# Patient Record
Sex: MALE | Race: WHITE
[De-identification: names, ages, dates, MRNs, and addresses within clinical notes are randomized per-mention and may not be internally consistent; named-entity substitution may affect disease eponyms.]

---

## 2017-02-10 ENCOUNTER — HOSPITAL ENCOUNTER (EMERGENCY)
Dept: HOSPITAL 57 - BURERS | Age: 82
Discharge: HOME | End: 2017-02-10
Payer: MEDICARE

## 2017-02-10 DIAGNOSIS — B35.6: Primary | ICD-10-CM

## 2017-02-10 DIAGNOSIS — Z79.899: ICD-10-CM

## 2017-02-10 DIAGNOSIS — E78.5: ICD-10-CM

## 2017-02-10 DIAGNOSIS — I10: ICD-10-CM

## 2017-02-10 PROCEDURE — 99283 EMERGENCY DEPT VISIT LOW MDM: CPT

## 2017-02-10 PROCEDURE — 36416 COLLJ CAPILLARY BLOOD SPEC: CPT

## 2017-10-23 ENCOUNTER — HOSPITAL ENCOUNTER (INPATIENT)
Dept: HOSPITAL 92 - T4-B | Age: 82
LOS: 4 days | Discharge: HOME | DRG: 603 | End: 2017-10-27
Attending: FAMILY MEDICINE | Admitting: FAMILY MEDICINE
Payer: MEDICARE

## 2017-10-23 VITALS — BODY MASS INDEX: 25.2 KG/M2

## 2017-10-23 DIAGNOSIS — I25.10: ICD-10-CM

## 2017-10-23 DIAGNOSIS — E78.5: ICD-10-CM

## 2017-10-23 DIAGNOSIS — Z95.1: ICD-10-CM

## 2017-10-23 DIAGNOSIS — Z95.2: ICD-10-CM

## 2017-10-23 DIAGNOSIS — Z83.3: ICD-10-CM

## 2017-10-23 DIAGNOSIS — Z88.5: ICD-10-CM

## 2017-10-23 DIAGNOSIS — Z82.49: ICD-10-CM

## 2017-10-23 DIAGNOSIS — I10: ICD-10-CM

## 2017-10-23 DIAGNOSIS — E87.1: ICD-10-CM

## 2017-10-23 DIAGNOSIS — I48.91: ICD-10-CM

## 2017-10-23 DIAGNOSIS — L03.116: Primary | ICD-10-CM

## 2017-10-23 LAB
ALP SERPL-CCNC: 52 U/L (ref 40–150)
ALT SERPL W P-5'-P-CCNC: 13 U/L (ref 8–55)
ANION GAP SERPL CALC-SCNC: 16 MMOL/L (ref 10–20)
AST SERPL-CCNC: 24 U/L (ref 5–34)
BASOPHILS # BLD AUTO: 0.1 THOU/UL (ref 0–0.2)
BASOPHILS NFR BLD AUTO: 0.5 % (ref 0–1)
BILIRUB SERPL-MCNC: 1.7 MG/DL (ref 0.2–1.2)
BUN SERPL-MCNC: 9 MG/DL (ref 8.4–25.7)
CALCIUM SERPL-MCNC: 9.3 MG/DL (ref 7.8–10.44)
CHLORIDE SERPL-SCNC: 89 MMOL/L (ref 98–107)
CO2 SERPL-SCNC: 27 MMOL/L (ref 23–31)
CREAT CL PREDICTED SERPL C-G-VRATE: 0 ML/MIN (ref 70–130)
EOSINOPHIL # BLD AUTO: 0.1 THOU/UL (ref 0–0.7)
EOSINOPHIL NFR BLD AUTO: 0.7 % (ref 0–10)
GLOBULIN SER CALC-MCNC: 3.9 G/DL (ref 2.4–3.5)
HCT VFR BLD CALC: 40.2 % (ref 42–52)
HYALINE CASTS #/AREA URNS LPF: (no result) LPF
LYMPHOCYTES # BLD: 1.4 THOU/UL (ref 1.2–3.4)
LYMPHOCYTES NFR BLD AUTO: 12.8 % (ref 21–51)
MONOCYTES # BLD AUTO: 0.9 THOU/UL (ref 0.11–0.59)
MONOCYTES NFR BLD AUTO: 8.4 % (ref 0–10)
NEUTROPHILS # BLD AUTO: 8.2 THOU/UL (ref 1.4–6.5)
RBC # BLD AUTO: 4.14 MILL/UL (ref 4.7–6.1)
RBC UR QL AUTO: (no result) HPF (ref 0–3)
WBC # BLD AUTO: 10.5 THOU/UL (ref 4.8–10.8)
WBC UR QL AUTO: (no result) HPF (ref 0–3)

## 2017-10-23 PROCEDURE — 81001 URINALYSIS AUTO W/SCOPE: CPT

## 2017-10-23 PROCEDURE — A4216 STERILE WATER/SALINE, 10 ML: HCPCS

## 2017-10-23 PROCEDURE — 93010 ELECTROCARDIOGRAM REPORT: CPT

## 2017-10-23 PROCEDURE — 84443 ASSAY THYROID STIM HORMONE: CPT

## 2017-10-23 PROCEDURE — 71020: CPT

## 2017-10-23 PROCEDURE — 80061 LIPID PANEL: CPT

## 2017-10-23 PROCEDURE — 85025 COMPLETE CBC W/AUTO DIFF WBC: CPT

## 2017-10-23 PROCEDURE — 93005 ELECTROCARDIOGRAM TRACING: CPT

## 2017-10-23 PROCEDURE — 80048 BASIC METABOLIC PNL TOTAL CA: CPT

## 2017-10-23 PROCEDURE — 87040 BLOOD CULTURE FOR BACTERIA: CPT

## 2017-10-23 PROCEDURE — 80053 COMPREHEN METABOLIC PANEL: CPT

## 2017-10-23 PROCEDURE — 36415 COLL VENOUS BLD VENIPUNCTURE: CPT

## 2017-10-23 PROCEDURE — 83930 ASSAY OF BLOOD OSMOLALITY: CPT

## 2017-10-23 RX ADMIN — Medication SCH ML: at 21:29

## 2017-10-23 RX ADMIN — LINEZOLID SCH MLS: 600 INJECTION, SOLUTION INTRAVENOUS at 21:27

## 2017-10-23 NOTE — EKG
Test Reason : 

Blood Pressure : ***/*** mmHG

Vent. Rate : 079 BPM     Atrial Rate : 079 BPM

   P-R Int : 226 ms          QRS Dur : 156 ms

    QT Int : 440 ms       P-R-T Axes : 062 -53 071 degrees

   QTc Int : 504 ms

 

Sinus rhythm with sinus arrhythmia with 1st degree A-V block

Right bundle branch block

Left anterior fascicular block

*** Bifascicular block ***

Abnormal ECG

When compared with ECG of 07-AUG-2015 07:04,

TX interval has increased

Nonspecific T wave abnormality no longer evident in Inferior leads

T wave inversion now evident in Lateral leads

Confirmed by DR. KRISTIE VILLA (3) on 10/23/2017 4:57:15 PM

 

Referred By:  RAISSA           Confirmed By:DR. KRISTIE VILLA

## 2017-10-24 LAB
ALP SERPL-CCNC: 45 U/L (ref 40–150)
ALT SERPL W P-5'-P-CCNC: 13 U/L (ref 8–55)
ANION GAP SERPL CALC-SCNC: 12 MMOL/L (ref 10–20)
AST SERPL-CCNC: 23 U/L (ref 5–34)
BASOPHILS # BLD AUTO: 0.1 THOU/UL (ref 0–0.2)
BASOPHILS NFR BLD AUTO: 1.4 % (ref 0–1)
BILIRUB SERPL-MCNC: 1.3 MG/DL (ref 0.2–1.2)
BUN SERPL-MCNC: 8 MG/DL (ref 8.4–25.7)
CALCIUM SERPL-MCNC: 8.3 MG/DL (ref 7.8–10.44)
CHLORIDE SERPL-SCNC: 93 MMOL/L (ref 98–107)
CO2 SERPL-SCNC: 24 MMOL/L (ref 23–31)
CREAT CL PREDICTED SERPL C-G-VRATE: 102 ML/MIN (ref 70–130)
EOSINOPHIL # BLD AUTO: 0.4 THOU/UL (ref 0–0.7)
EOSINOPHIL NFR BLD AUTO: 4.9 % (ref 0–10)
GLOBULIN SER CALC-MCNC: 2.8 G/DL (ref 2.4–3.5)
HCT VFR BLD CALC: 36.3 % (ref 42–52)
LYMPHOCYTES # BLD: 1.1 THOU/UL (ref 1.2–3.4)
LYMPHOCYTES NFR BLD AUTO: 15.7 % (ref 21–51)
MONOCYTES # BLD AUTO: 1 THOU/UL (ref 0.11–0.59)
MONOCYTES NFR BLD AUTO: 13.3 % (ref 0–10)
NEUTROPHILS # BLD AUTO: 4.7 THOU/UL (ref 1.4–6.5)
RBC # BLD AUTO: 3.65 MILL/UL (ref 4.7–6.1)
WBC # BLD AUTO: 7.3 THOU/UL (ref 4.8–10.8)

## 2017-10-24 RX ADMIN — Medication SCH ML: at 19:58

## 2017-10-24 RX ADMIN — LINEZOLID SCH MLS: 600 INJECTION, SOLUTION INTRAVENOUS at 08:33

## 2017-10-24 RX ADMIN — Medication SCH: at 10:28

## 2017-10-24 RX ADMIN — LINEZOLID SCH MLS: 600 INJECTION, SOLUTION INTRAVENOUS at 19:57

## 2017-10-24 NOTE — HP
YOB: 1935

 

HISTORY OF PRESENT ILLNESS:  This is an 81-year-old white male with a history of atrial fibrillation
, status post AVR, coronary artery disease who presents with a 3-day history of increasing left leg/
foot swelling and redness.  This all began on Friday, 3 days prior when he was possibly stung by an 
insect.  He was wearing pants.   [place]  Over the past several days, his left 
foot and leg have been becoming much worse with increasing swelling and redness.  He does not report
 any fever or chills, nausea or vomiting.  He was seen in the urgent care and given Keflex without i
mprovement.  He states he has been elevating his leg in the recliner.

 

PAST MEDICAL HISTORY:  Hypertension, hyperlipidemia, atrial fibrillation, gout, arthritis, heart mur
mur, moderate aortic and mitral sclerosis with aortic stenosis, coronary artery disease status post 
bypass followed by Dr. Eldridge, aortic valve replacement by Dr. Blancas, history of hematuria.

 

ALLERGIES:  MORPHINE.

 

PAST SURGICAL HISTORY:  Left carotid endarterectomy 07/15/2017, coronary bypass graft x1 with aortic
 valve replacement by Dr. Blancas 09/30/2009, sigmoid colectomy in 1999, right knee surgery, tonsillec
nahum, craniotomy age 3 from a fall, cystoscopy negative, colonoscopy 2006, repeat 10 years Dr. Cantrell,
 last echo by Dr. Eldridge in 10/2016.

 

FAMILY HISTORY:  Mother with diabetes.  Siblings with lung cancer.

 

SOCIAL HISTORY:  He is , retired .  Does not smoke, does not drink.  He has 7 chi
ldren, 27 grandchildren, 45 great, 1 great great grandson and daughter.

 

REVIEW OF SYSTEMS:  As above.

 

PHYSICAL EXAMINATION:

VITAL SIGNS:  Weight 159, temperature 97.8, heart rate 73, blood pressure 110/64.

GENERAL:  In no acute distress.

HEENT:  Clear.

HEART:  Regular rate and rhythm, minimal murmur noted.

LUNGS:  Clear.  No rales, rhonchi.

ABDOMEN:  Soft, nontender.

EXTREMITIES:  Left foot with marked 2+ edema and erythema extending up to the 3/4 of this leg, an ab
rasion is present on his ankle.

 

LABORATORY AND X-RAY FINDINGS:  None.

 

ASSESSMENT:

1.  Cellulitis of left leg.

2.  History of atrial fibrillation.

3.  Hypertension.

4.  Hyperlipidemia.

5.  Coronary artery disease.

6.  Status post aortic valve replacement.

 

PLAN:

1.  Admit.

2.  Elevate.

3.  IV Zosyn 600 mg q.12h.

4.  Can resume home medications which include Eliquis 5 b.i.d.

5.  We will monitor and hopefully discharged in 2-3 days.

## 2017-10-24 NOTE — CON
DATE OF SERVICE:  10/24/2017

 

The patient is much better this morning.

 

OBJECTIVE:

VITAL SIGNS:  Temperature 98.2, pulse 65, respirations 20, pulse ox 95, blood pressure 110/69.

HEART:  Regular rate and rhythm.

LUNGS:  Clear.

ABDOMEN:  Soft.

EXTREMITIES:  Left leg significantly decreased and with decrease in swelling.  Marked decrease in er
ythema.

 

LABORATORY:  White count 7.3, H\T\H 12 and 36, sodium 126, potassium 3.0, creatinine 0.58, BUN 8.

 

ASSESSMENT:

1.  Left leg and foot cellulitis markedly improved, but still present.

2.  Hyponatremia.  We will hold fluids and also discontinue the HCTZ.  We will also fluid restrict.

3.  History of atrial fibrillation, stable.

4.  Hypertension.

5.  Hyperlipidemia.

6.  Coronary artery disease.

7.  Status post aortic valve replacement.

 

PLAN:

1.  Fluid restriction to 1000 mL per day.

2.  Keep left foot elevated.

3.  Continue IV Zosyn.

4.  CBC and BMP in a.m.

5.  Add KCl 20 p.o. b.i.d.

6.  Hold HCTZ.

7.  Fluid restrict to 1000 mL.

## 2017-10-25 LAB
ANION GAP SERPL CALC-SCNC: 13 MMOL/L (ref 10–20)
BASOPHILS # BLD AUTO: 0 THOU/UL (ref 0–0.2)
BASOPHILS NFR BLD AUTO: 0.4 % (ref 0–1)
BUN SERPL-MCNC: 7 MG/DL (ref 8.4–25.7)
CALCIUM SERPL-MCNC: 8.3 MG/DL (ref 7.8–10.44)
CHLORIDE SERPL-SCNC: 96 MMOL/L (ref 98–107)
CO2 SERPL-SCNC: 23 MMOL/L (ref 23–31)
CREAT CL PREDICTED SERPL C-G-VRATE: 104 ML/MIN (ref 70–130)
EOSINOPHIL # BLD AUTO: 0.3 THOU/UL (ref 0–0.7)
EOSINOPHIL NFR BLD AUTO: 4.9 % (ref 0–10)
HCT VFR BLD CALC: 37.1 % (ref 42–52)
LYMPHOCYTES # BLD: 1.5 THOU/UL (ref 1.2–3.4)
LYMPHOCYTES NFR BLD AUTO: 21.2 % (ref 21–51)
MONOCYTES # BLD AUTO: 0.9 THOU/UL (ref 0.11–0.59)
MONOCYTES NFR BLD AUTO: 12.4 % (ref 0–10)
NEUTROPHILS # BLD AUTO: 4.3 THOU/UL (ref 1.4–6.5)
RBC # BLD AUTO: 3.91 MILL/UL (ref 4.7–6.1)
WBC # BLD AUTO: 7 THOU/UL (ref 4.8–10.8)

## 2017-10-25 RX ADMIN — Medication SCH ML: at 07:50

## 2017-10-25 RX ADMIN — LINEZOLID SCH MLS: 600 INJECTION, SOLUTION INTRAVENOUS at 07:48

## 2017-10-25 RX ADMIN — LINEZOLID SCH MLS: 600 INJECTION, SOLUTION INTRAVENOUS at 19:51

## 2017-10-25 RX ADMIN — Medication SCH ML: at 19:52

## 2017-10-25 NOTE — CON
DATE OF SERVICE:  10/25/2017 at 8 a.m.

 

SUBJECTIVE:  The patient continues to do well.  His leg pain is decreasing.  He does ambulate to the
 bathroom.

 

OBJECTIVE:

VITAL SIGNS:  Temperature 98.0, pulse 64, respirations 18, pulse ox 94, blood pressure 124/73.

HEART:  Regular rate and rhythm.

LUNGS:  Clear.

ABDOMEN:  Soft.

EXTREMITIES:  Left leg with decreasing swelling and erythema.  The erythema is slowly receding.  Per
ipheral pulses are 2+.

 

LABORATORY AND X-RAY FINDINGS:  White count 7.0, H\T\H 12 and 37.  Sodium went from 126 to 128, pota
ssium went from 3.0 to 3.6, creatinine 0.57, BUN 7, blood sugar 89.

 

ASSESSMENT:

1.  Left leg and foot cellulitis with decreasing erythema and swelling.

2.  Hyponatremia, it has improved somewhat.  We did hold the HCTZ and fluid restrict the patient yes
terday.  We will continue to follow.

3.  History of atrial fibrillation, stable.

4.  Hypertension.

5.  Hyperlipidemia.

6.  Coronary artery disease.

7.  Status post aortic valve replacement.

 

PLAN:

1.  Continue fluid restriction.

2.  Keep foot elevated.

3.  Continue IV Zosyn.

4.  BMP in a.m.

5.  Continue with KCl 20 b.i.d.

## 2017-10-26 LAB
ANION GAP SERPL CALC-SCNC: 10 MMOL/L (ref 10–20)
BUN SERPL-MCNC: 8 MG/DL (ref 8.4–25.7)
CALCIUM SERPL-MCNC: 8.7 MG/DL (ref 7.8–10.44)
CHLORIDE SERPL-SCNC: 97 MMOL/L (ref 98–107)
CO2 SERPL-SCNC: 26 MMOL/L (ref 23–31)
CREAT CL PREDICTED SERPL C-G-VRATE: 94 ML/MIN (ref 70–130)

## 2017-10-26 RX ADMIN — Medication SCH ML: at 09:26

## 2017-10-26 RX ADMIN — Medication SCH ML: at 21:41

## 2017-10-26 RX ADMIN — LINEZOLID SCH MLS: 600 INJECTION, SOLUTION INTRAVENOUS at 21:41

## 2017-10-26 RX ADMIN — LINEZOLID SCH MLS: 600 INJECTION, SOLUTION INTRAVENOUS at 09:27

## 2017-10-26 NOTE — PRG
DATE OF SERVICE:  10/26/2017

 

SUBJECTIVE:  The patient states his pain is markedly improved.  He states the redness in his leg has
 decreased dramatically.

 

OBJECTIVE:

VITAL SIGNS:  Afebrile at 97.7, pulse 60, respirations 16, pulse oximetry 94, blood pressure 118/72.

HEART:  Regular rate and rhythm.

LUNGS:  Clear.

ABDOMEN:  Soft.

EXTREMITIES:  Left leg decreased erythema and swelling.

 

LABORATORY DATA:  Sodium 129 slowly rising, potassium 4.3, creatinine 0.63, BUN 8, blood sugar 84.

 

ASSESSMENT:

1.  Left foot and leg cellulitis, improving.

2.  Hyponatremia, slowly improving.

3.  History of atrial fibrillation.

4.  Hypertension.

5.  Hyperlipidemia.

6.  Coronary artery disease.

7.  Status post aortic valve replacement.

 

PLAN:

1.  Continue fluid restriction.

2.  Keep foot elevated.

3.  Continue IV Zosyn.

4.  BMP in a.m.

5.  Hopefully can discharge in the a.m.

## 2017-10-27 VITALS — DIASTOLIC BLOOD PRESSURE: 64 MMHG | TEMPERATURE: 97.9 F | SYSTOLIC BLOOD PRESSURE: 116 MMHG

## 2017-10-27 LAB
ANION GAP SERPL CALC-SCNC: 11 MMOL/L (ref 10–20)
BUN SERPL-MCNC: 9 MG/DL (ref 8.4–25.7)
CALCIUM SERPL-MCNC: 8.4 MG/DL (ref 7.8–10.44)
CHLORIDE SERPL-SCNC: 100 MMOL/L (ref 98–107)
CHOLEST SERPL-MCNC: 100 MG/DL
CO2 SERPL-SCNC: 21 MMOL/L (ref 23–31)
CREAT CL PREDICTED SERPL C-G-VRATE: 100 ML/MIN (ref 70–130)
LDLC SERPL CALC-MCNC: 55 MG/DL
OSMOLALITY SERPL: 271 MOSM/KG (ref 280–295)

## 2017-10-27 NOTE — DIS
DISCHARGE DIAGNOSES:

1.  Left leg and foot cellulitis.

2.  Hyponatremia.

3.  Coronary artery disease.

4.  Hypertension.

5.  Hyperlipidemia.

6.  History of aortic valve replacement.

7.  History of atrial fibrillation.

 

DISCHARGE MEDICATIONS:  Cefdinir 300 p.o. b.i.d. #20.  Allopurinol 300 p.o. daily, Norco 1 p.o. q.4h
. p.r.n. 5/325, Eliquis 5 p.o. b.i.d., Lipitor 20 p.o. daily, Gabapentin 300 p.o. daily.  Stop HCTZ.

 

BRIEF HISTORY:  This is an 81-year-old white male admitted from the office for left foot/leg celluli
tis.  This began several days prior to admission.  He states it began with an insect bite.  He was w
earing pants.  [place]  For several days, his left foot and leg progressively becam
e more swollen, red and painful.

 

HOSPITAL COURSE:  The patient was admitted.  He was placed on IV Zyvox 600 b.i.d.  The patient has d
one well.  His swelling and redness has decreased dramatically.  He is feeling much better.  He has 
remained afebrile the entire time.  His sodium has been a problem.  Sodium has remained stable betwe
en 126 and 128.  His mentation has been completely normal.  His HCTZ was stopped and his fluids were
 restricted to less than 1000 mL per day.  The patient continues to do well.  I did talk with Dr. Mono mae and he states that it may take time for the sodium to correct.  He will see the patient next week.
  I will see the patient in 2 weeks.  

 

Sodium 128, potassium 4.42, chloride 100, creatinine 0.59, BUN 9, glucose 84.  White count 7.0, H\T\
H 12 and 37, platelets of 214.  Urine is clear.  I did order a plasma osmolality, urine sodium, ches
t x-ray, TSH, and lipids prior to discharge.

## 2017-10-27 NOTE — RAD
TWO VIEWS CHEST:

 

10/27/2017

 

HISTORY:

Hyponatremia.

 

COMPARISON:

03/10/2012

 

TECHNIQUE:

PA and lateral views of the chest obtained.

 

FINDINGS:

Two views of the chest demonstrate sternotomy wires seen.  A prosthetic cardiac valve is in place.

 

There is minimal blunting of the costophrenic angles, compatible with pleural scar.  No acute intrat
horacic abnormality is seen.

 

IMPRESSION:

Cardiomegaly and some pleural scarring.  No acute intrathoracic abnormalities seen.

 

POS: [facility]

## 2017-12-15 ENCOUNTER — HOSPITAL ENCOUNTER (OUTPATIENT)
Dept: HOSPITAL 92 - ULT | Age: 82
Discharge: HOME | End: 2017-12-15
Attending: UROLOGY
Payer: MEDICARE

## 2017-12-15 DIAGNOSIS — N20.0: Primary | ICD-10-CM

## 2017-12-15 PROCEDURE — 76770 US EXAM ABDO BACK WALL COMP: CPT

## 2017-12-15 PROCEDURE — 74000: CPT

## 2017-12-15 NOTE — RAD
AP VIEW ABDOMEN:

 

HISTORY: 

Calcaneus of kidney.

 

FINDINGS: 

AP view abdomen is obtained on 12/15/17.  Comparison is made to previous exam from 12/6/16.

 

AP view abdomen demonstrates degenerative changes seen in the lumbar spine.  The abdominal gas patter
n is nonspecific.  No evidence of obstruction or ileus is seen.  No definite evidence of renal calcul
i seen.  Some stable calcifications are seen in the right pelvis most compatible with phleboliths.  T
he vascular calcifications are also seen.  If there is concern for renal calculi, correlate with CT f
or further workup.  Exam is limited due to the fact that the patient has a large amount of stool in t
he colon.

 

IMPRESSION: 

No definite evidence of renal calculi seen.

 

POS: [facility]

## 2017-12-15 NOTE — ULT
BILATERAL RENAL ULTRASOUND:

 

HISTORY: 

Renal calculi.

 

DATE: 12/15/17.

 

FINDINGS: 

Multiple longitudinal and transverse images of the kidneys and bladder are obtained using a multihert
z curvilinear transducer.  Rela-time and color flow images are used to evaluate the kidneys and bladd
er.

 

Both kidneys are of normal contour, axis and size.  The right kidney measures 10.9 and the left kidne
y 11.2 cm from pole to pole.  Several small shadowing structures are seen in the left kidney.  I oscar
ot exclude the possibility of some left renal calculi.  If these are calculi, they are quite small an
d may not be present on plain film radiography.  There may be a small approximately 5-6 mm calculus i
n the mid pole of the left kidney.  The bladder is unremarkable.

 

IMPRESSION: 

Possible but not definite evidence of small mid pole left renal calculi.

 

POS: [facility]

## 2018-05-03 ENCOUNTER — HOSPITAL ENCOUNTER (OUTPATIENT)
Dept: HOSPITAL 92 - SDC | Age: 83
Discharge: HOME | End: 2018-05-03
Attending: OTOLARYNGOLOGY
Payer: MEDICARE

## 2018-05-03 VITALS — BODY MASS INDEX: 29.9 KG/M2

## 2018-05-03 DIAGNOSIS — M10.9: ICD-10-CM

## 2018-05-03 DIAGNOSIS — C76.0: Primary | ICD-10-CM

## 2018-05-03 DIAGNOSIS — Z98.890: ICD-10-CM

## 2018-05-03 DIAGNOSIS — E78.5: ICD-10-CM

## 2018-05-03 DIAGNOSIS — I25.10: ICD-10-CM

## 2018-05-03 DIAGNOSIS — M19.90: ICD-10-CM

## 2018-05-03 DIAGNOSIS — Z88.5: ICD-10-CM

## 2018-05-03 DIAGNOSIS — I10: ICD-10-CM

## 2018-05-03 DIAGNOSIS — I48.91: ICD-10-CM

## 2018-05-03 DIAGNOSIS — Z95.1: ICD-10-CM

## 2018-05-03 LAB
HGB BLD-MCNC: 13.7 G/DL (ref 14–18)
MCH RBC QN AUTO: 33.9 PG (ref 27–31)
MCV RBC AUTO: 98.7 FL (ref 80–94)
MDIFF COMPLETE?: YES
PLATELET # BLD AUTO: 110 THOU/UL (ref 130–400)
PLATELET BLD QL SMEAR: (no result)
RBC # BLD AUTO: 4.05 MILL/UL (ref 4.7–6.1)
WBC # BLD AUTO: 6.9 THOU/UL (ref 4.8–10.8)

## 2018-05-03 PROCEDURE — 88305 TISSUE EXAM BY PATHOLOGIST: CPT

## 2018-05-03 PROCEDURE — 88332 PATH CONSLTJ SURG EA ADD BLK: CPT

## 2018-05-03 PROCEDURE — 93005 ELECTROCARDIOGRAM TRACING: CPT

## 2018-05-03 PROCEDURE — 88341 IMHCHEM/IMCYTCHM EA ADD ANTB: CPT

## 2018-05-03 PROCEDURE — 88331 PATH CONSLTJ SURG 1 BLK 1SPC: CPT

## 2018-05-03 PROCEDURE — 88360 TUMOR IMMUNOHISTOCHEM/MANUAL: CPT

## 2018-05-03 PROCEDURE — 85025 COMPLETE CBC W/AUTO DIFF WBC: CPT

## 2018-05-03 PROCEDURE — 88342 IMHCHEM/IMCYTCHM 1ST ANTB: CPT

## 2018-05-03 PROCEDURE — 93010 ELECTROCARDIOGRAM REPORT: CPT

## 2018-05-03 PROCEDURE — 36415 COLL VENOUS BLD VENIPUNCTURE: CPT

## 2018-05-03 PROCEDURE — 0JB10ZZ EXCISION OF FACE SUBCUTANEOUS TISSUE AND FASCIA, OPEN APPROACH: ICD-10-PCS | Performed by: OTOLARYNGOLOGY

## 2018-05-04 NOTE — EKG
Test Reason : PREOP

Blood Pressure : ***/*** mmHG

Vent. Rate : 079 BPM     Atrial Rate : 079 BPM

   P-R Int : 234 ms          QRS Dur : 146 ms

    QT Int : 446 ms       P-R-T Axes : 089 -51 077 degrees

   QTc Int : 511 ms

 

Sinus rhythm with 1st degree A-V block

Right bundle branch block

Left anterior fascicular block

*** Bifascicular block ***

Abnormal ECG

When compared with ECG of 23-OCT-2017 13:20,

T wave inversion less evident in Anterior leads

Confirmed by DR. KRISTIE VILLA (3) on 5/4/2018 7:25:05 AM

 

Referred By:  TYRA           Confirmed By:DR. KRISTIE VILLA

## 2018-05-16 NOTE — CT
CT ABDOMEN AND PELVIS WITHOUT CONTRAST:

 

Technique: 

Multiple axial tomograms were obtained through the abdomen and pelvis without IV enhancement. 

 

Indications:

Renal calculus. Abdominal pain. 

 

Comparison: 

June 2010

 

FINDINGS: 

Images through the lung bases reveal small bilateral pleural effusions. 

 

Liver, spleen, and pancreas appear unremarkable within the limitations of an unenhanced exam. 

 

There is a density layering dependently in the gallbladder, consistent with tiny stones, gravel or de
nse sludge. 

 

Adrenal glands appear normal. 

 

Review of the kidneys show two tiny nonobstructing calculi in the midpole collecting structures of th
e left kidney, one measuring approximately 1 mm and the other measuring approximately 2 mm.  There is
 no hydronephrosis. Ureters are unremarkable. The bladder is contracted and not well evaluated. 

 

Small bowel loops appear normal. Appendix is not identified. Colon is unremarkable. There are scatter
ed diverticula without CT evidence of diverticulitis. 

 

Aorta is calcified but normal caliber. No adenopathy or soft tissue mass lesion identified. Prostate 
is mildly enlarged. Osseous structures appear unremarkable. 

 

IMPRESSION: 

1. Small bilateral pleural effusions. 

2. Tiny stones/gravel in the gallbladder. 

3. Two tiny nonobstructing calculi in the upper collecting structures of the left kidney. No evidence
 of ureteral calculus or hydronephrosis. 

 

POS: [facility]

## 2018-06-25 ENCOUNTER — HOSPITAL ENCOUNTER (INPATIENT)
Dept: HOSPITAL 92 - T4-A | Age: 83
LOS: 4 days | Discharge: HOME | DRG: 603 | End: 2018-06-29
Attending: FAMILY MEDICINE | Admitting: FAMILY MEDICINE
Payer: MEDICARE

## 2018-06-25 VITALS — BODY MASS INDEX: 25.2 KG/M2

## 2018-06-25 DIAGNOSIS — I25.10: ICD-10-CM

## 2018-06-25 DIAGNOSIS — I48.91: ICD-10-CM

## 2018-06-25 DIAGNOSIS — Z95.2: ICD-10-CM

## 2018-06-25 DIAGNOSIS — I10: ICD-10-CM

## 2018-06-25 DIAGNOSIS — L03.115: Primary | ICD-10-CM

## 2018-06-25 DIAGNOSIS — M10.9: ICD-10-CM

## 2018-06-25 DIAGNOSIS — E78.5: ICD-10-CM

## 2018-06-25 DIAGNOSIS — Z95.1: ICD-10-CM

## 2018-06-25 PROCEDURE — 82565 ASSAY OF CREATININE: CPT

## 2018-06-25 PROCEDURE — 85025 COMPLETE CBC W/AUTO DIFF WBC: CPT

## 2018-06-25 PROCEDURE — 36415 COLL VENOUS BLD VENIPUNCTURE: CPT

## 2018-06-25 PROCEDURE — 80048 BASIC METABOLIC PNL TOTAL CA: CPT

## 2018-06-25 PROCEDURE — 85018 HEMOGLOBIN: CPT

## 2018-06-25 PROCEDURE — 80202 ASSAY OF VANCOMYCIN: CPT

## 2018-06-25 PROCEDURE — 85014 HEMATOCRIT: CPT

## 2018-06-25 PROCEDURE — A4216 STERILE WATER/SALINE, 10 ML: HCPCS

## 2018-06-25 PROCEDURE — 85049 AUTOMATED PLATELET COUNT: CPT

## 2018-06-25 RX ADMIN — VANCOMYCIN HYDROCHLORIDE SCH MLS: 1 INJECTION, SOLUTION INTRAVENOUS at 20:15

## 2018-06-25 RX ADMIN — POTASSIUM CHLORIDE AND SODIUM CHLORIDE SCH MLS: 450; 150 INJECTION, SOLUTION INTRAVENOUS at 18:35

## 2018-06-25 NOTE — HP
HISTORY OF PRESENT ILLNESS:  This is an 82-year-old white male with a history of coronary artery dise
ase, atrial fibrillation, status post aortic valve replacement who presents with a right leg cellulit
is.  The patient does have a history last year of a left leg cellulitis in which he was hospitalized 
for 5 days.  He has done well since then.  He presents with a 5-day history of right leg redness and 
swelling.  He was initially seen at Urgent Care and given antibiotics and followed up in the office a
 day later.  His wounds seem to be improving.  His erythema area was less than the demarcated lines; 
however, he presents today with his leg significantly worse with increased redness up the leg.  Howev
er, he does not complain of any fever.

 

PAST MEDICAL HISTORY:  Hypertension, hyperlipidemia, atrial fibrillation history, gout, arthritis, he
art murmur, aortic stenosis, coronary artery disease, aortic valve replacement by Dr. Blancas.

 

Cardiology is Dr. Eldridge.

 

ALLERGIES:  MORPHINE.

 

PAST SURGICAL HISTORY:  Carotid endarterectomy, 07/2015, Dr. Blancas; coronary artery bypass grafting x
1 with aortic valve replacement, 09/2009; sigmoid colectomy, 1999; tonsillectomy; right knee surgery;
 craniotomy, age 3 secondary to a fall from a high chair; colonoscopy, 2006; skin cancer removal, 05/
2018.

 

FAMILY HISTORY:  Positive for diabetes, lung cancer, skin cancer.

 

SOCIAL HISTORY:  He is .  He is a retired .  Nonsmoker.  He does have 7 children, 
27 grand, 45 great grand, and 1 great great grandson.

 

REVIEW OF SYSTEMS:  As above.

 

PHYSICAL EXAMINATION:

VITAL SIGNS:  Blood pressure 110/62, temperature 98.8, O2 sat 99, respirations 16.

GENERAL:  No acute distress.

HEENT:  Clear.

HEART:  Regular rate and rhythm.

LUNGS:  Clear.

ABDOMEN:  Soft, nontender.

EXTREMITIES:  With right leg erythema, swelling, edema extending from the foot to the right knee.

 

LABORATORY DATA:  None at this time.

 

ASSESSMENT:

1.  Right leg cellulitis.

2.  History of coronary artery disease.

3.  Hypertension.

4.  Hyperlipidemia.

5.  History of atrial fibrillation.

6.  History of gout.

7.  Status post bypass grafting.

8.  Status post aortic valve replacement.

 

PLAN:

1.  Admit.

2.  Routine vitals.

3.  IV fluids.

4.  CBC, UA, comprehensive, blood cultures x2.

5.  Elevate right leg on 2 pillows.

 

MEDICATIONS:  Zosyn 3.375 IV q.6 hours, vancomycin 1 gram IV q.12 hours, Tylenol 10 grains p.r.n. fev
er, Eliquis 5 mg b.i.d., simvastatin 40 daily, gabapentin 300 at bedtime, digoxin 0.125 daily, cimeti
dine 400 p.o. b.i.d.

## 2018-06-26 LAB
ANION GAP SERPL CALC-SCNC: 12 MMOL/L (ref 10–20)
BASOPHILS # BLD AUTO: 0 THOU/UL (ref 0–0.2)
BASOPHILS NFR BLD AUTO: 0.6 % (ref 0–1)
BUN SERPL-MCNC: 11 MG/DL (ref 8.4–25.7)
CALCIUM SERPL-MCNC: 8.7 MG/DL (ref 7.8–10.44)
CHLORIDE SERPL-SCNC: 102 MMOL/L (ref 98–107)
CO2 SERPL-SCNC: 25 MMOL/L (ref 23–31)
CREAT CL PREDICTED SERPL C-G-VRATE: 79 ML/MIN (ref 70–130)
EOSINOPHIL # BLD AUTO: 0.2 THOU/UL (ref 0–0.7)
EOSINOPHIL NFR BLD AUTO: 3.5 % (ref 0–10)
GLUCOSE SERPL-MCNC: 78 MG/DL (ref 83–110)
HGB BLD-MCNC: 11.9 G/DL (ref 14–18)
LYMPHOCYTES # BLD: 1.1 THOU/UL (ref 1.2–3.4)
LYMPHOCYTES NFR BLD AUTO: 17.5 % (ref 21–51)
MCH RBC QN AUTO: 33 PG (ref 27–31)
MCV RBC AUTO: 98.1 FL (ref 78–98)
MONOCYTES # BLD AUTO: 0.9 THOU/UL (ref 0.11–0.59)
MONOCYTES NFR BLD AUTO: 13.9 % (ref 0–10)
NEUTROPHILS # BLD AUTO: 3.9 THOU/UL (ref 1.4–6.5)
NEUTROPHILS NFR BLD AUTO: 64.4 % (ref 42–75)
PLATELET # BLD AUTO: 168 THOU/UL (ref 130–400)
POTASSIUM SERPL-SCNC: 4.1 MMOL/L (ref 3.5–5.1)
RBC # BLD AUTO: 3.61 MILL/UL (ref 4.7–6.1)
SODIUM SERPL-SCNC: 135 MMOL/L (ref 136–145)
WBC # BLD AUTO: 6.1 THOU/UL (ref 4.8–10.8)

## 2018-06-26 RX ADMIN — POTASSIUM CHLORIDE AND SODIUM CHLORIDE SCH MLS: 450; 150 INJECTION, SOLUTION INTRAVENOUS at 20:15

## 2018-06-26 RX ADMIN — VANCOMYCIN HYDROCHLORIDE SCH MLS: 1 INJECTION, SOLUTION INTRAVENOUS at 20:15

## 2018-06-26 RX ADMIN — VANCOMYCIN HYDROCHLORIDE SCH MLS: 1 INJECTION, SOLUTION INTRAVENOUS at 10:16

## 2018-06-26 RX ADMIN — Medication SCH: at 10:17

## 2018-06-26 RX ADMIN — Medication SCH: at 20:15

## 2018-06-26 NOTE — PRG
DATE OF SERVICE:  06/26/2018 at 0700 hours.

 

SUBJECTIVE:  The patient's leg is feeling much better, but still in quite a bit of pain.

 

OBJECTIVE:

VITAL SIGNS:  Temperature 98.3, pulse 65, respirations 16, pulse ox 97, blood pressure 112/69.

HEART:  Regular rate and rhythm.

LUNGS:  Clear.

ABDOMEN:  Soft.

EXTREMITIES:  Right leg with approximately 15% decrease in erythema and swelling markedly improved, b
ut still a long way to go.

 

LABORATORY DATA:  White count 6.1, H&H 11 and 35.  Electrolytes normal.  Creatinine 0.74, BUN 11, glu
cose 78.

 

ASSESSMENT:

1.  Right leg cellulitis, improving.

2.  History of coronary artery disease.

3.  Hypertension.

4.  Hyperlipidemia.

5.  History of atrial fibrillation.

6.  History of gout.

7.  Status post bypass grafting.

8.  Status post aortic valve replacement.

 

PLAN:

1.  Continue Zosyn and vancomycin.

2.  Awaiting cultures.

3.  Continue to monitor right leg and keep elevated.

## 2018-06-27 LAB — VANCOMYCIN TROUGH SERPL-MCNC: 11.7 UG/ML

## 2018-06-27 RX ADMIN — Medication SCH ML: at 10:49

## 2018-06-27 RX ADMIN — VANCOMYCIN HYDROCHLORIDE SCH MLS: 1 INJECTION, POWDER, LYOPHILIZED, FOR SOLUTION INTRAVENOUS at 10:47

## 2018-06-27 RX ADMIN — Medication SCH: at 20:31

## 2018-06-27 RX ADMIN — VANCOMYCIN HYDROCHLORIDE SCH: 1 INJECTION, SOLUTION INTRAVENOUS at 10:46

## 2018-06-27 RX ADMIN — VANCOMYCIN HYDROCHLORIDE SCH MLS: 1 INJECTION, POWDER, LYOPHILIZED, FOR SOLUTION INTRAVENOUS at 20:33

## 2018-06-27 RX ADMIN — POTASSIUM CHLORIDE AND SODIUM CHLORIDE SCH MLS: 450; 150 INJECTION, SOLUTION INTRAVENOUS at 20:29

## 2018-06-27 NOTE — PRG
DATE OF SERVICE:  06/27/2018

 

SUBJECTIVE:  The patient's right leg is feeling so much better.  He is very pleased.  He is also very
 pleased with the hospital and the staff.  He said they are all very kind.

 

OBJECTIVE:

VITAL SIGNS:  Temperature 97.9, pulse 67, respirations 18, pulse ox 93, blood pressure 115/67.

HEART:  Regular rate and rhythm.

LUNGS:  Clear.

ABDOMEN:  Soft.  

EXTREMITIES:  Right lower extremity with significant the decreased swelling and erythema.  Marked dec
reased tenderness.  The leg is much improved.

 

LABORATORY:  None.

 

ASSESSMENT:

1.  Right leg cellulitis, improving.

2.  History of coronary disease.

3.  Hypertension.

4.  Hyperlipidemia.

5.  History of atrial fibrillation.

6.  History of gout.

7.  Status post bypass grafting.

8.  Status post aortic valve replacement.

 

PLAN:

1.  Continue Zosyn and vancomycin.  Vancomycin level is being adjusted by pharmacy.

2.  Continue to elevate leg.  

3.  Blood cultures remain negative.

## 2018-06-28 LAB — VANCOMYCIN TROUGH SERPL-MCNC: 16.7 UG/ML

## 2018-06-28 RX ADMIN — VANCOMYCIN HYDROCHLORIDE SCH MLS: 1 INJECTION, POWDER, LYOPHILIZED, FOR SOLUTION INTRAVENOUS at 10:07

## 2018-06-28 RX ADMIN — POTASSIUM CHLORIDE AND SODIUM CHLORIDE SCH: 450; 150 INJECTION, SOLUTION INTRAVENOUS at 17:24

## 2018-06-28 RX ADMIN — Medication SCH: at 08:47

## 2018-06-28 RX ADMIN — Medication SCH: at 20:44

## 2018-06-28 RX ADMIN — VANCOMYCIN HYDROCHLORIDE SCH MLS: 1 INJECTION, POWDER, LYOPHILIZED, FOR SOLUTION INTRAVENOUS at 22:18

## 2018-06-28 NOTE — PRG
DATE OF SERVICE:  06/28/2018

 

The patient states his right leg continues to get better and better every day.  No complaints of feve
r, chest pain, shortness of breath.  The patient is ambulatory throughout the day.

 

OBJECTIVE:

VITAL SIGNS:  Temperature 98.5, pulse 62, respirations 18, pulse ox 92, blood pressure 119/63.

HEART:  Regular rate and rhythm.

LUNGS:  Clear.

ABDOMEN:  Soft.  

EXTREMITIES:  Right leg continues to improve.  Decreased erythema, decreased swelling.  Not quite teofilo
dy for discharge.

 

LABORATORY:  None.

 

ASSESSMENT:

1.  Right leg cellulitis, improving.

2.  History of coronary artery disease.

3.  Hypertension.

4.  Hyperlipidemia.

5.  History of atrial fibrillation.

6.  History of gout.

7.  Status post bypass grafting.

8.  Status post aortic valve replacement.

 

PLAN:

1.  Continue Zosyn and vancomycin.

2.  Continue to elevate leg.

3.  Hopefully, may discharge the patient tomorrow.  The patient still has significant erythema and sw
elling, but markedly improved since admission.

## 2018-06-29 VITALS — SYSTOLIC BLOOD PRESSURE: 135 MMHG | DIASTOLIC BLOOD PRESSURE: 77 MMHG | TEMPERATURE: 98.1 F

## 2018-06-29 LAB
CREAT CL PREDICTED SERPL C-G-VRATE: 98 ML/MIN (ref 70–130)
HGB BLD-MCNC: 12.8 G/DL (ref 14–18)
PLATELET # BLD AUTO: 214 THOU/UL (ref 130–400)

## 2018-06-29 RX ADMIN — VANCOMYCIN HYDROCHLORIDE SCH: 1 INJECTION, POWDER, LYOPHILIZED, FOR SOLUTION INTRAVENOUS at 10:28

## 2018-06-29 RX ADMIN — Medication SCH: at 10:28

## 2018-06-29 NOTE — DIS
DATE OF ADMISSION:  06/25/2018

 

DATE OF DISCHARGE:  06/29/2018

 

DISCHARGE DIAGNOSES:

1.  Right leg cellulitis.

2.  History of coronary artery disease.

3.  Hypertension.

4.  Hyperlipidemia.

5.  History of atrial fibrillation.

6.  History of gout.

7.  Status post bypass grafting.

8.  Status post aortic valve replacement.

 

DISCHARGE MEDICATIONS:  Cefdinir 300 one p.o. b.i.d. #20, allopurinol 300 mg daily, Eliquis 5 p.o. b.
i.d., cimetidine 200 b.i.d., digoxin 0.125 mg daily, gabapentin 300 mg daily, Viagra 100 p.r.n., simv
astatin 40 daily.

 

BRIEF HISTORY:  This is an 82-year-old white male with history of coronary artery disease, atrial fib
rillation, status post aortic valve replacement who presented with a right leg cellulitis from the [facility].  The patient does have a history of a left leg cellulitis last year and he was hospitalized for
 5 days.  He has done well since then.  The patient does frequently go outside and shorts and present
s with multiple abrasions and insect bites to the legs.  He presents with a 5-day history of right le
g redness and swelling which has become progressively worse and he was brought in by family for [facility]
er treatment.  He was initially seen in the Urgent Care and given antibiotics; however, his infection
 became much worse.  Upon presentation to the office his right leg was completely red and swollen fro
m the toes to the knee.

 

HOSPITAL COURSE:  He was admitted.  He was placed on bed rest and his right leg was elevated on 2 pil
lows.  He was started on Zosyn and vancomycin.  During his hospital stay, his right leg cellulitis ha
s nearly resolved.  He still has some mild erythema and mild swelling, but he has had remarkable impr
ovement.  I have instructed him to elevate his leg to 3 daily.  I instructed him not to sit in a ioana
r for 5-6 hours.  I also recommended that when he goes outside to wear some _____(02:54) loose pants.
  I reinforce him not to wear shorts outside in order to protect his legs.

 

LABORATORY DATA:  White count 6.1, H and H 11 and 35, platelet 168.  Sodium 135, potassium 4.1, creat
inine 0.74, BUN 11, glucose 78.  No blood cultures done.

 

The patient has been instructed to follow up in the office in 10 days for further evaluation.  He is 
to come back to the emergency room for any reoccurrence of his redness or swelling.